# Patient Record
Sex: MALE | Race: WHITE | NOT HISPANIC OR LATINO | Employment: PART TIME | URBAN - METROPOLITAN AREA
[De-identification: names, ages, dates, MRNs, and addresses within clinical notes are randomized per-mention and may not be internally consistent; named-entity substitution may affect disease eponyms.]

---

## 2017-08-01 ENCOUNTER — TELEPHONE (OUTPATIENT)
Dept: PRIMARY CARE CLINIC | Facility: CLINIC | Age: 62
End: 2017-08-01

## 2017-08-01 NOTE — TELEPHONE ENCOUNTER
----- Message from Corine Villaseñor sent at 8/1/2017  9:50 AM CDT -----  Contact: [facility], Mally Bal with [employer] called inquiring if the patient still requires use of his oxygen.  Please call Mally back at [phone] to discuss.  Thank you!

## 2017-08-04 ENCOUNTER — TELEPHONE (OUTPATIENT)
Dept: PRIMARY CARE CLINIC | Facility: CLINIC | Age: 62
End: 2017-08-04

## 2017-08-04 NOTE — TELEPHONE ENCOUNTER
----- Message from Manisha Garcia sent at 8/3/2017 11:23 AM CDT -----  Contact: [facility] [employer]  Authorization has  for oxygen, needs to verify patient still needs oxygen.  Please call back  fax .

## 2017-08-07 NOTE — TELEPHONE ENCOUNTER
[employer] need sheet with pulse ox level , O2 needed -- need old one and a new form to fill out or need pulse ox as out pt showing pulse ox less then or equal to 88 or medical reason on O2.

## 2017-08-08 NOTE — TELEPHONE ENCOUNTER
Spoke with karma from [facility], states she will fax over oxygen sheet to be filled out when pt comes in on august 15th.

## 2017-08-14 ENCOUNTER — TELEPHONE (OUTPATIENT)
Dept: PRIMARY CARE CLINIC | Facility: CLINIC | Age: 62
End: 2017-08-14

## 2017-08-14 NOTE — TELEPHONE ENCOUNTER
----- Message from Delores Bennett sent at 8/14/2017  4:42 PM CDT -----  Contact: Mally with [employer] at [phone]  Mally with [employer] at [phone], calling to speak with the office regarding retesting the patient for his oxygen, during his appointment tomorrow at 3 PM. Please advise. Thanks.

## 2017-08-14 NOTE — TELEPHONE ENCOUNTER
Spoke with karma at [facility] [employer], states shes just verifying that we will fax back paper work with current o2 stats after pts appointment tomorrow. Notified her that we will

## 2017-08-21 ENCOUNTER — TELEPHONE (OUTPATIENT)
Dept: PRIMARY CARE CLINIC | Facility: CLINIC | Age: 62
End: 2017-08-21

## 2017-08-21 NOTE — TELEPHONE ENCOUNTER
Called Mally confirming pts rescheduled apt for tomorrow pt needs to be retested on room air and sent back with H&P

## 2017-08-21 NOTE — TELEPHONE ENCOUNTER
----- Message from Karen Martines sent at 2017 12:45 PM CDT -----  [facility] / [phone] / Mally asking to verify his appointment ... His oxygen  8/15/17

## 2017-08-22 ENCOUNTER — OFFICE VISIT (OUTPATIENT)
Dept: PRIMARY CARE CLINIC | Facility: CLINIC | Age: 62
End: 2017-08-22
Payer: MEDICAID

## 2017-08-22 VITALS
DIASTOLIC BLOOD PRESSURE: 55 MMHG | BODY MASS INDEX: 40.73 KG/M2 | TEMPERATURE: 99 F | HEIGHT: 69 IN | OXYGEN SATURATION: 80 % | WEIGHT: 275 LBS | HEART RATE: 89 BPM | SYSTOLIC BLOOD PRESSURE: 97 MMHG | RESPIRATION RATE: 20 BRPM

## 2017-08-22 DIAGNOSIS — I95.9 HYPOTENSION, UNSPECIFIED HYPOTENSION TYPE: ICD-10-CM

## 2017-08-22 DIAGNOSIS — R09.02 HYPOXIA: ICD-10-CM

## 2017-08-22 DIAGNOSIS — R60.0 PERIPHERAL EDEMA: Primary | ICD-10-CM

## 2017-08-22 DIAGNOSIS — J44.9 CHRONIC OBSTRUCTIVE PULMONARY DISEASE, UNSPECIFIED COPD TYPE: ICD-10-CM

## 2017-08-22 DIAGNOSIS — K42.9 UMBILICAL HERNIA WITHOUT OBSTRUCTION AND WITHOUT GANGRENE: ICD-10-CM

## 2017-08-22 DIAGNOSIS — E11.9 TYPE 2 DIABETES MELLITUS WITHOUT COMPLICATION, WITHOUT LONG-TERM CURRENT USE OF INSULIN: ICD-10-CM

## 2017-08-22 PROCEDURE — 99213 OFFICE O/P EST LOW 20 MIN: CPT | Mod: S$GLB,,, | Performed by: FAMILY MEDICINE

## 2017-08-22 PROCEDURE — 4010F ACE/ARB THERAPY RXD/TAKEN: CPT | Mod: S$GLB,,, | Performed by: FAMILY MEDICINE

## 2017-08-22 PROCEDURE — 3008F BODY MASS INDEX DOCD: CPT | Mod: S$GLB,,, | Performed by: FAMILY MEDICINE

## 2017-08-22 RX ORDER — MICONAZOLE NITRATE 2 %
2 CREAM (GRAM) TOPICAL
Refills: 1 | COMMUNITY
Start: 2017-07-14 | End: 2017-08-22 | Stop reason: SDUPTHER

## 2017-08-22 RX ORDER — CYCLOBENZAPRINE HCL 10 MG
10 TABLET ORAL NIGHTLY
Qty: 90 TABLET | Refills: 3 | Status: SHIPPED | OUTPATIENT
Start: 2017-08-22

## 2017-08-22 RX ORDER — FUROSEMIDE 20 MG/1
TABLET ORAL
Qty: 30 TABLET | Refills: 2 | Status: SHIPPED | OUTPATIENT
Start: 2017-08-22

## 2017-08-22 RX ORDER — TRAMADOL HYDROCHLORIDE 50 MG/1
50 TABLET ORAL DAILY
Qty: 30 TABLET | Refills: 0
Start: 2017-08-22

## 2017-08-22 RX ORDER — METFORMIN HYDROCHLORIDE 500 MG/1
500 TABLET ORAL 2 TIMES DAILY WITH MEALS
Qty: 180 TABLET | Refills: 3 | Status: SHIPPED | OUTPATIENT
Start: 2017-08-22

## 2017-08-22 RX ORDER — TEMAZEPAM 30 MG/1
1 CAPSULE ORAL NIGHTLY
Refills: 2 | COMMUNITY
Start: 2017-07-05 | End: 2017-08-22 | Stop reason: SDUPTHER

## 2017-08-22 RX ORDER — TRAMADOL HYDROCHLORIDE 50 MG/1
1 TABLET ORAL DAILY
Refills: 0 | COMMUNITY
Start: 2017-07-13 | End: 2017-08-22 | Stop reason: SDUPTHER

## 2017-08-22 RX ORDER — LISINOPRIL 10 MG/1
10 TABLET ORAL DAILY
COMMUNITY
End: 2017-08-22

## 2017-08-22 RX ORDER — CYCLOBENZAPRINE HCL 10 MG
10 TABLET ORAL NIGHTLY
COMMUNITY
End: 2017-08-22 | Stop reason: SDUPTHER

## 2017-08-22 RX ORDER — LISINOPRIL 5 MG/1
5 TABLET ORAL DAILY
Qty: 90 TABLET | Refills: 3 | Status: SHIPPED | OUTPATIENT
Start: 2017-08-22 | End: 2018-08-22

## 2017-08-22 RX ORDER — MICONAZOLE NITRATE 2 %
2 CREAM (GRAM) TOPICAL
Qty: 200 EACH | Refills: 1 | COMMUNITY
Start: 2017-08-22

## 2017-08-22 RX ORDER — TEMAZEPAM 30 MG/1
30 CAPSULE ORAL NIGHTLY
Qty: 30 CAPSULE | Refills: 2
Start: 2017-08-22

## 2017-08-22 RX ORDER — METFORMIN HYDROCHLORIDE 500 MG/1
500 TABLET ORAL 2 TIMES DAILY WITH MEALS
COMMUNITY
End: 2017-08-22 | Stop reason: SDUPTHER

## 2017-08-22 NOTE — PROGRESS NOTES
Subjective:       Patient ID: Hieu Khan is a 62 y.o. male.    Chief Complaint: Annual Exam (needs visit to renew oxygen, med refill )    HPI: 61 yo WM in for oxygen renewal . Pulse Ox 80 % . Lives [place]. No car rides bike everywhere he goes. Works part time [employer]     ROS:  Skin: no psoriasis, eczema, skin cancer  HEENT: No headache, ocular pain, blurred vision, diplopia, epistaxis, hoarseness change in voice, thyroid trouble  Lung: Hx  Pneumonia 10-12 yrs ago , no asthma, no Tb,occas  wheezing, SOB,+ COPD -- has neb uses it albuterol inhaler. --Breo   Heart: No chest pain, +ankle edema, no palpitations, MI, jazmyne murmur, hypertension, hyperlipidemia--low BP  Abdomen: + umbilical hernia  No nausea, vomiting, diarrhea, constipation, ulcers, hepatitis, gallbladder disease, melena, hematochezia, hematemesis  : no UTI, renal disease, stones no prostate   MS: no fractures, O/A, lupus, rheumatoid, gout  Neuro: No dizziness, LOC, seizures   No diabetes, no anemia, no anxiety, no depression    2 children . Work gas station stock , cash register, , serve beer, grill oysters.     Objective:   Physical Exam:  General: Well nourished, well developed, no acute distress + obese   Skin: No lesions  HEENT: Eyes PERRLA, EOM intact, nose patent, throat non-erythematous   NECK: Supple, no bruits, No JVD, no nodes  Lungs: Clear, no rales, rhonchi, wheezing  Heart: Regular rate and rhythm, no murmurs, gallops, or rubs  Abdomen: flat, bowel sounds positive, no tenderness, or organomegaly + periumbilical hernia   MS: Range of motion and muscle strength intact--some arthritis   Neuro: Alert, CN intact, oriented X 3  Extremities: No cyanosis, clubbing, + edema +1/4 pitting         Assessment:       1. Peripheral edema    2. Chronic obstructive pulmonary disease, unspecified COPD type    3. Hypoxia    4. Umbilical hernia without obstruction and without gangrene    5. Hypotension, unspecified hypotension type    6.  Type 2 diabetes mellitus without complication, without long-term current use of insulin        Plan:       Peripheral edema    Chronic obstructive pulmonary disease, unspecified COPD type    Hypoxia  -     EKG 12-lead; Future  -     TSH; Future; Expected date: 08/22/2017  -     Ambulatory referral to General Surgery    Umbilical hernia without obstruction and without gangrene    Hypotension, unspecified hypotension type  -     CBC auto differential; Future; Expected date: 08/22/2017  -     Comprehensive metabolic panel; Future; Expected date: 08/22/2017  -     Lipid panel; Future; Expected date: 08/22/2017  -     EKG 12-lead; Future  -     X-Ray Chest PA And Lateral; Future; Expected date: 08/22/2017  -     Urinalysis  -     TSH; Future; Expected date: 08/22/2017  -     Ambulatory referral to General Surgery  -     Ambulatory referral to Cardiology    Type 2 diabetes mellitus without complication, without long-term current use of insulin  -     Hemoglobin A1c; Future; Expected date: 08/22/2017    Other orders  -     metformin (GLUCOPHAGE) 500 MG tablet; Take 1 tablet (500 mg total) by mouth 2 (two) times daily with meals.  Dispense: 180 tablet; Refill: 3  -     cyclobenzaprine (FLEXERIL) 10 MG tablet; Take 1 tablet (10 mg total) by mouth every evening.  Dispense: 90 tablet; Refill: 3  -     tramadol (ULTRAM) 50 mg tablet; Take 1 tablet (50 mg total) by mouth once daily.  Dispense: 30 tablet; Refill: 0  -     temazepam (RESTORIL) 30 mg capsule; Take 1 capsule (30 mg total) by mouth every evening.  Dispense: 30 capsule; Refill: 2  -     lisinopril (PRINIVIL,ZESTRIL) 5 MG tablet; Take 1 tablet (5 mg total) by mouth once daily.  Dispense: 90 tablet; Refill: 3  -     nicotine, polacrilex, (NICORETTE) 2 mg Gum; Take 1 each (2 mg total) by mouth 5 (five) times daily.  Dispense: 200 each; Refill: 1  -     furosemide (LASIX) 20 MG tablet; 1 po q AM prn swelling legs hold if BP sys less than 90 or diastolic less than 60   Dispense: 30 tablet; Refill: 2      Pt told see DR Alva see if desires fix umbilical hernia Needs lab Restoril 30 2 R , Ultram 30 NR if needs pain and sleep meds to pain clinci

## 2019-03-20 DIAGNOSIS — Z12.11 COLON CANCER SCREENING: ICD-10-CM
